# Patient Record
Sex: FEMALE | Race: WHITE | Employment: FULL TIME | URBAN - METROPOLITAN AREA
[De-identification: names, ages, dates, MRNs, and addresses within clinical notes are randomized per-mention and may not be internally consistent; named-entity substitution may affect disease eponyms.]

---

## 2018-08-28 PROBLEM — Z53.20 COLONOSCOPY REFUSED: Status: ACTIVE | Noted: 2018-08-28

## 2019-05-14 ENCOUNTER — TELEPHONE (OUTPATIENT)
Dept: PRIMARY CARE CLINIC | Age: 54
End: 2019-05-14

## 2019-09-12 ENCOUNTER — TELEPHONE (OUTPATIENT)
Dept: PRIMARY CARE CLINIC | Age: 54
End: 2019-09-12

## 2019-10-11 ENCOUNTER — TELEPHONE (OUTPATIENT)
Dept: PRIMARY CARE CLINIC | Age: 54
End: 2019-10-11

## 2019-11-01 ENCOUNTER — TELEPHONE (OUTPATIENT)
Dept: PRIMARY CARE CLINIC | Age: 54
End: 2019-11-01

## 2022-01-05 ENCOUNTER — HOSPITAL ENCOUNTER (EMERGENCY)
Age: 57
Discharge: HOME | End: 2022-01-05
Attending: EMERGENCY MEDICINE
Payer: COMMERCIAL

## 2022-01-05 VITALS
OXYGEN SATURATION: 100 % | BODY MASS INDEX: 31.28 KG/M2 | WEIGHT: 170 LBS | HEART RATE: 74 BPM | DIASTOLIC BLOOD PRESSURE: 100 MMHG | SYSTOLIC BLOOD PRESSURE: 152 MMHG | HEIGHT: 62 IN | TEMPERATURE: 97.9 F | RESPIRATION RATE: 16 BRPM

## 2022-01-05 DIAGNOSIS — H57.12 ACUTE LEFT EYE PAIN: Primary | ICD-10-CM

## 2022-01-05 PROCEDURE — 99284 EMERGENCY DEPT VISIT MOD MDM: CPT

## 2022-01-05 PROCEDURE — 6370000000 HC RX 637 (ALT 250 FOR IP): Performed by: NURSE PRACTITIONER

## 2022-01-05 RX ORDER — TETRACAINE HYDROCHLORIDE 5 MG/ML
1 SOLUTION OPHTHALMIC ONCE
Status: COMPLETED | OUTPATIENT
Start: 2022-01-05 | End: 2022-01-05

## 2022-01-05 RX ADMIN — TETRACAINE HYDROCHLORIDE 1 DROP: 5 SOLUTION OPHTHALMIC at 16:03

## 2022-01-05 ASSESSMENT — PAIN DESCRIPTION - LOCATION: LOCATION: EYE

## 2022-01-05 ASSESSMENT — ENCOUNTER SYMPTOMS
EYE REDNESS: 0
EYE DISCHARGE: 0
EYE ITCHING: 0
GASTROINTESTINAL NEGATIVE: 1
EYE PAIN: 1
PHOTOPHOBIA: 0
RESPIRATORY NEGATIVE: 1

## 2022-01-05 ASSESSMENT — VISUAL ACUITY: OU: 20/30

## 2022-01-05 ASSESSMENT — PAIN DESCRIPTION - PAIN TYPE: TYPE: ACUTE PAIN

## 2022-01-05 ASSESSMENT — PAIN DESCRIPTION - ORIENTATION: ORIENTATION: LEFT

## 2022-01-05 ASSESSMENT — PAIN SCALES - GENERAL: PAINLEVEL_OUTOF10: 2

## 2022-01-05 NOTE — Clinical Note
Mayra Kang was seen and treated in our emergency department on 1/5/2022. She may return to work on 01/09/2022. Please allow patient to be off Thursday and Friday     If you have any questions or concerns, please don't hesitate to call.       SONALI Jackson - NP

## 2022-01-05 NOTE — ED PROVIDER NOTES
[address] [facility]  [address] [facility] [address]  Phone: [phone]  Fax: [phone]        Pt Name: Natan Atkins  MRN: [id number]  [place] 1965  Date of evaluation: 22    [address] [place] [address]       Chief Complaint   Patient presents with    Eye Injury     STATES A BOX FELL DOWN AND HIT HER LEFT EYE       HISTORY OF PRESENT ILLNESS (Location/Symptom, Timing/Onset, Context/Setting, Quality, Duration, Modifying Factors, Severity)      Natan Atkins is a 64 y.o. female with no pertinent PMH who presents to the ED via private auto with complaints of left eye pain. Patient states she was at work putting a about 5 pound on a shelf when it slipped off the shelf and hit her in her left eye. Patient denies any vision change at this time, states when it did happen a few hours ago she did notice some watering of her eye, otherwise she is not really having much pain at this time. Patient states she does have just a slight burning sensation of the eye, otherwise no blurred vision or photophobia. Patient denies any headache, dizziness, lightheadedness. Patient states she did not fall or lose consciousness when the incident occurred. Patient denies any recent fever, chills, body aches. Patient denies any nausea, vomiting, diarrhea. Patient denies any feelings of lethargy or fatigue. PAST MEDICAL / SURGICAL / SOCIAL / FAMILY HISTORY     PMH:  has no past medical history on file. Surgical History:  has a past surgical history that includes shoulder surgery (Right); Tonsillectomy;  section; and Russell tooth extraction. Social History:  reports that she has never smoked. She has never used smokeless tobacco. She reports that she does not drink alcohol. Family History: She indicated that her mother is alive. She indicated that the status of her brother is unknown.   family history includes Cancer in her mother; Diabetes in her sister;  Heart Disease in her brother, mother, sister, sister, and sister; Kidney Disease in her sister. Psychiatric History: None    Allergies: Patient has no known allergies. Home Medications:   Prior to Admission medications    Not on File       REVIEW OF SYSTEMS  (2-9 systems for level 4, 10 ormore for level 5)      Review of Systems   Constitutional: Negative. HENT: Negative. Eyes: Positive for pain. Negative for photophobia, discharge, redness, itching and visual disturbance. Left eye   Respiratory: Negative. Cardiovascular: Negative. Gastrointestinal: Negative. Endocrine: Negative. Genitourinary: Negative. Musculoskeletal: Negative. Skin: Negative. Neurological: Negative. Hematological: Negative. Psychiatric/Behavioral: Negative. All other systems negative except as marked. PHYSICAL EXAM  (up to 7 for level 4, 8 or more for level 5)      INITIAL VITALS:  height is 5' 2\" (1.575 m) and weight is 77.1 kg (170 lb). Her oral temperature is 97.9 °F (36.6 °C). Her blood pressure is 152/100 (abnormal) and her pulse is 74. Her respiration is 16 and oxygen saturation is 100%. Vital signs reviewed. Physical Exam  Constitutional:       Appearance: Normal appearance. HENT:      Head: Normocephalic. Right Ear: Tympanic membrane, ear canal and external ear normal.      Left Ear: Tympanic membrane, ear canal and external ear normal.      Nose: Nose normal.      Mouth/Throat:      Mouth: Mucous membranes are moist.      Pharynx: Oropharynx is clear. Eyes:      Extraocular Movements: Extraocular movements intact. Pupils: Pupils are equal, round, and reactive to light. Comments: Left eye slightly watery; eye appears red and irritated; no noted foreign body with exam   Cardiovascular:      Rate and Rhythm: Normal rate and regular rhythm. Pulses: Normal pulses. Heart sounds: Normal heart sounds.    Pulmonary:      Effort: Pulmonary effort is normal.      Breath sounds: Normal breath sounds. Abdominal:      General: Bowel sounds are normal.      Palpations: Abdomen is soft. Musculoskeletal:         General: Normal range of motion. Cervical back: Normal range of motion. Skin:     General: Skin is warm and dry. Capillary Refill: Capillary refill takes less than 2 seconds. Neurological:      Mental Status: She is alert and oriented to person, place, and time. Psychiatric:         Mood and Affect: Mood normal.         Behavior: Behavior normal.         Thought Content: Thought content normal.         Judgment: Judgment normal.           DIFFERENTIAL DIAGNOSIS / MDM     Upon exam, patient resting company in her room. Denies the need for any pain medication at this time. She states she is up-to-date on her tetanus. Patient states the pain is more of a burning sensation. Patient does have a slight abrasion surrounding her right eye, no open areas noted. Patient denies any eye pain with movement. No visible foreign body. No cervical lymphadenopathy. Denies a headache at this time, dizziness, lightheadedness. Will stain eye to rule out corneal abrasion. Patient agrees with plan of care. No corneal abrasion noted. Patient tolerated exam well. Patient requesting work note for tomorrow and Friday. Will provide work note. Please use saline drops keep the eye moist.  Patient educated on signs of infection of both eye and surrounding abrasion. Patient educated to follow-up with primary care. Please return to the emergency department any worsening of symptoms occluding any vision changes whatsoever, severe headache. Patient states understanding of education. Patient stable for discharge at this time. PLAN (LABS / IMAGING / EKG):  No orders of the defined types were placed in this encounter.       MEDICATIONS ORDERED:  Orders Placed This Encounter   Medications    tetracaine (TETRAVISC) 0.5 % ophthalmic solution 1 drop       Controlled Substances Monitoring: DIAGNOSTIC RESULTS     EKG: All EKG's are interpreted by the Emergency Department Physician who either signs or Co-signs this chart in the [address] a cardiologist.      RADIOLOGY: All images are read by the radiologist and their interpretations are reviewed. No orders to display       No results found. LABS:  No results found for this visit on 01/05/22. EMERGENCY DEPARTMENT COURSE           Vitals:    Vitals:    01/05/22 1449   BP: (!) 152/100   Pulse: 74   Resp: 16   Temp: 97.9 °F (36.6 °C)   TempSrc: Oral   SpO2: 100%   Weight: 77.1 kg (170 lb)   Height: 5' 2\" (1.575 m)     -------------------------  BP: (!) 152/100, Temp: 97.9 °F (36.6 °C), Pulse: 74, Resp: 16      RE-EVALUATION:  See ED Course notes above. CONSULTS:  None    PROCEDURES:  None    FINAL IMPRESSION      1. Acute left eye pain          DISPOSITION / PLAN     CONDITION ON DISPOSITION:   Good / Stable for discharge. PATIENT REFERRED TO:  [facility] ED  [address]   [address] [address]  [phone]  Go to   If symptoms worsen    PCP  [address] [facility]  Call in 1 day        DISCHARGE MEDICATIONS:  Discharge Medication List as of 1/5/2022  3:42 PM          SONALI Hurtado NP   Emergency Medicine Nurse Practitioner    (Please note that portions of this note were completed with a voice recognition program.  Efforts were made to edit the dictations but occasionally words aremis-transcribed.)     SONALI Hurtado NP  01/05/22 9955

## 2022-01-05 NOTE — Clinical Note
Luis Mcgraw was seen and treated in our emergency department on 1/5/2022. She may return to work on 01/09/2022. Please allow patient to be off Thursday and Friday     If you have any questions or concerns, please don't hesitate to call.       Omero Cruz, APRN - NP

## 2022-01-05 NOTE — ED PROVIDER NOTES
[address] [facility] ED  eMERGENCY dEPARTMENT eNCOUnter   Independent Attestation     Pt Name: Samira Poe  MRN: [id number]  [place] 1965  Date of evaluation: 1/5/22       Samira Poe is a 64 y.o. female who presents with Eye Injury ([address] HIT HER LEFT EYE)        Based on the medical record, the care appears appropriate. I was personally available for consultation in the Emergency Department.     Meghan Salter DO  Attending Emergency  Physician                 Meghan Salter DO  01/05/22 1524